# Patient Record
Sex: FEMALE | Race: WHITE | NOT HISPANIC OR LATINO | Employment: STUDENT | URBAN - METROPOLITAN AREA
[De-identification: names, ages, dates, MRNs, and addresses within clinical notes are randomized per-mention and may not be internally consistent; named-entity substitution may affect disease eponyms.]

---
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
## 2021-01-06 DIAGNOSIS — C83.30 DIFFUSE LARGE B-CELL LYMPHOMA, UNSPECIFIED BODY REGION: Primary | ICD-10-CM

## 2021-01-15 ENCOUNTER — HOSPITAL ENCOUNTER (OUTPATIENT)
Dept: PEDIATRIC CARDIOLOGY | Facility: HOSPITAL | Age: 21
Discharge: HOME | End: 2021-01-15
Attending: PEDIATRICS
Payer: COMMERCIAL

## 2021-01-15 ENCOUNTER — HOSPITAL ENCOUNTER (OUTPATIENT)
Dept: RADIOLOGY | Facility: HOSPITAL | Age: 21
Discharge: HOME | End: 2021-01-15
Attending: PEDIATRICS
Payer: COMMERCIAL

## 2021-01-15 ENCOUNTER — OFFICE VISIT (OUTPATIENT)
Dept: PEDIATRIC HEMATOLOGY/ONCOLOGY | Facility: CLINIC | Age: 21
End: 2021-01-15
Payer: COMMERCIAL

## 2021-01-15 VITALS
BODY MASS INDEX: 29.37 KG/M2 | RESPIRATION RATE: 18 BRPM | HEART RATE: 92 BPM | WEIGHT: 155.56 LBS | SYSTOLIC BLOOD PRESSURE: 129 MMHG | DIASTOLIC BLOOD PRESSURE: 73 MMHG | HEIGHT: 61 IN | TEMPERATURE: 98 F

## 2021-01-15 DIAGNOSIS — C83.30 DIFFUSE LARGE B-CELL LYMPHOMA, UNSPECIFIED BODY REGION: ICD-10-CM

## 2021-01-15 DIAGNOSIS — C83.30 DIFFUSE LARGE B-CELL LYMPHOMA, UNSPECIFIED BODY REGION: Primary | ICD-10-CM

## 2021-01-15 PROCEDURE — 3008F BODY MASS INDEX DOCD: CPT | Mod: CPTII,S$GLB,, | Performed by: PEDIATRICS

## 2021-01-15 PROCEDURE — 3008F PR BODY MASS INDEX (BMI) DOCUMENTED: ICD-10-PCS | Mod: CPTII,S$GLB,, | Performed by: PEDIATRICS

## 2021-01-15 PROCEDURE — 93304 PR ECHO XTHORACIC,CONG A2M,LIMITED: ICD-10-PCS | Mod: 26,,, | Performed by: PEDIATRICS

## 2021-01-15 PROCEDURE — 93304 ECHO TRANSTHORACIC: CPT | Mod: 26,,, | Performed by: PEDIATRICS

## 2021-01-15 PROCEDURE — 73723 MRI JOINT LWR EXTR W/O&W/DYE: CPT | Mod: TC,LT

## 2021-01-15 PROCEDURE — 99999 PR PBB SHADOW E&M-EST. PATIENT-LVL III: ICD-10-PCS | Mod: PBBFAC,,, | Performed by: PEDIATRICS

## 2021-01-15 PROCEDURE — 73723 MRI KNEE W WO CONTRAST LEFT: ICD-10-PCS | Mod: 26,LT,, | Performed by: RADIOLOGY

## 2021-01-15 PROCEDURE — A9585 GADOBUTROL INJECTION: HCPCS | Performed by: PEDIATRICS

## 2021-01-15 PROCEDURE — 93321 PR DOPPLER ECHO HEART,LIMITED,F/U: ICD-10-PCS | Mod: 26,,, | Performed by: PEDIATRICS

## 2021-01-15 PROCEDURE — 99214 OFFICE O/P EST MOD 30 MIN: CPT | Mod: S$GLB,,, | Performed by: PEDIATRICS

## 2021-01-15 PROCEDURE — 93321 DOPPLER ECHO F-UP/LMTD STD: CPT | Mod: 26,,, | Performed by: PEDIATRICS

## 2021-01-15 PROCEDURE — 25500020 PHARM REV CODE 255: Performed by: PEDIATRICS

## 2021-01-15 PROCEDURE — 1126F AMNT PAIN NOTED NONE PRSNT: CPT | Mod: S$GLB,,, | Performed by: PEDIATRICS

## 2021-01-15 PROCEDURE — 99214 PR OFFICE/OUTPT VISIT, EST, LEVL IV, 30-39 MIN: ICD-10-PCS | Mod: S$GLB,,, | Performed by: PEDIATRICS

## 2021-01-15 PROCEDURE — 99999 PR PBB SHADOW E&M-EST. PATIENT-LVL III: CPT | Mod: PBBFAC,,, | Performed by: PEDIATRICS

## 2021-01-15 PROCEDURE — 93325 PR DOPPLER COLOR FLOW VELOCITY MAP: ICD-10-PCS | Mod: 26,,, | Performed by: PEDIATRICS

## 2021-01-15 PROCEDURE — 73723 MRI JOINT LWR EXTR W/O&W/DYE: CPT | Mod: 26,LT,, | Performed by: RADIOLOGY

## 2021-01-15 PROCEDURE — 1126F PR PAIN SEVERITY QUANTIFIED, NO PAIN PRESENT: ICD-10-PCS | Mod: S$GLB,,, | Performed by: PEDIATRICS

## 2021-01-15 PROCEDURE — 93325 DOPPLER ECHO COLOR FLOW MAPG: CPT | Mod: 26,,, | Performed by: PEDIATRICS

## 2021-01-15 RX ORDER — GADOBUTROL 604.72 MG/ML
7 INJECTION INTRAVENOUS
Status: COMPLETED | OUTPATIENT
Start: 2021-01-15 | End: 2021-01-15

## 2021-01-15 RX ADMIN — GADOBUTROL 7 ML: 604.72 INJECTION INTRAVENOUS at 02:01

## 2021-12-10 ENCOUNTER — OFFICE VISIT (OUTPATIENT)
Dept: URGENT CARE | Facility: CLINIC | Age: 21
End: 2021-12-10
Payer: COMMERCIAL

## 2021-12-10 VITALS
WEIGHT: 155 LBS | TEMPERATURE: 98 F | HEART RATE: 109 BPM | RESPIRATION RATE: 16 BRPM | HEIGHT: 61 IN | DIASTOLIC BLOOD PRESSURE: 70 MMHG | BODY MASS INDEX: 29.27 KG/M2 | SYSTOLIC BLOOD PRESSURE: 119 MMHG | OXYGEN SATURATION: 97 %

## 2021-12-10 DIAGNOSIS — J02.9 VIRAL PHARYNGITIS: Primary | ICD-10-CM

## 2021-12-10 DIAGNOSIS — J06.9 UPPER RESPIRATORY INFECTION WITH COUGH AND CONGESTION: ICD-10-CM

## 2021-12-10 DIAGNOSIS — J10.1 INFLUENZA B: ICD-10-CM

## 2021-12-10 LAB
CTP QC/QA: YES
HETEROPH AB SER QL: NEGATIVE
MOLECULAR STREP A: NEGATIVE
POC MOLECULAR INFLUENZA A AGN: NEGATIVE
POC MOLECULAR INFLUENZA B AGN: POSITIVE

## 2021-12-10 PROCEDURE — 87651 STREP A DNA AMP PROBE: CPT | Mod: QW,S$GLB,, | Performed by: PHYSICIAN ASSISTANT

## 2021-12-10 PROCEDURE — 99213 OFFICE O/P EST LOW 20 MIN: CPT | Mod: S$GLB,,, | Performed by: PHYSICIAN ASSISTANT

## 2021-12-10 PROCEDURE — 87651 POCT STREP A MOLECULAR: ICD-10-PCS | Mod: QW,S$GLB,, | Performed by: PHYSICIAN ASSISTANT

## 2021-12-10 PROCEDURE — 86308 POCT INFECTIOUS MONONUCLEOSIS: ICD-10-PCS | Mod: QW,S$GLB,, | Performed by: PHYSICIAN ASSISTANT

## 2021-12-10 PROCEDURE — 86308 HETEROPHILE ANTIBODY SCREEN: CPT | Mod: QW,S$GLB,, | Performed by: PHYSICIAN ASSISTANT

## 2021-12-10 PROCEDURE — 87502 POCT INFLUENZA A/B MOLECULAR: ICD-10-PCS | Mod: QW,S$GLB,, | Performed by: PHYSICIAN ASSISTANT

## 2021-12-10 PROCEDURE — 99213 PR OFFICE/OUTPT VISIT, EST, LEVL III, 20-29 MIN: ICD-10-PCS | Mod: S$GLB,,, | Performed by: PHYSICIAN ASSISTANT

## 2021-12-10 PROCEDURE — 87502 INFLUENZA DNA AMP PROBE: CPT | Mod: QW,S$GLB,, | Performed by: PHYSICIAN ASSISTANT

## 2021-12-10 RX ORDER — FLUTICASONE PROPIONATE 50 MCG
2 SPRAY, SUSPENSION (ML) NASAL DAILY
Qty: 9.9 ML | Refills: 0 | Status: SHIPPED | OUTPATIENT
Start: 2021-12-10 | End: 2021-12-17

## 2021-12-10 RX ORDER — BROMPHENIRAMINE MALEATE, PSEUDOEPHEDRINE HYDROCHLORIDE, AND DEXTROMETHORPHAN HYDROBROMIDE 2; 30; 10 MG/5ML; MG/5ML; MG/5ML
10 SYRUP ORAL EVERY 4 HOURS PRN
Qty: 120 ML | Refills: 0 | Status: SHIPPED | OUTPATIENT
Start: 2021-12-10 | End: 2021-12-17

## 2021-12-13 ENCOUNTER — TELEPHONE (OUTPATIENT)
Dept: URGENT CARE | Facility: CLINIC | Age: 21
End: 2021-12-13
Payer: COMMERCIAL

## 2022-01-11 ENCOUNTER — HOSPITAL ENCOUNTER (OUTPATIENT)
Dept: RADIOLOGY | Facility: HOSPITAL | Age: 22
Discharge: HOME | End: 2022-01-11
Attending: PEDIATRICS
Payer: COMMERCIAL

## 2022-01-11 ENCOUNTER — HOSPITAL ENCOUNTER (OUTPATIENT)
Dept: PEDIATRIC CARDIOLOGY | Facility: HOSPITAL | Age: 22
Discharge: HOME | End: 2022-01-11
Attending: PEDIATRICS
Payer: COMMERCIAL

## 2022-01-11 ENCOUNTER — OFFICE VISIT (OUTPATIENT)
Dept: PEDIATRIC HEMATOLOGY/ONCOLOGY | Facility: CLINIC | Age: 22
End: 2022-01-11
Payer: COMMERCIAL

## 2022-01-11 VITALS
HEIGHT: 61 IN | WEIGHT: 160.06 LBS | HEART RATE: 82 BPM | BODY MASS INDEX: 30.22 KG/M2 | RESPIRATION RATE: 20 BRPM | SYSTOLIC BLOOD PRESSURE: 113 MMHG | TEMPERATURE: 98 F | DIASTOLIC BLOOD PRESSURE: 63 MMHG

## 2022-01-11 DIAGNOSIS — C83.30 DIFFUSE LARGE B-CELL LYMPHOMA, UNSPECIFIED BODY REGION: ICD-10-CM

## 2022-01-11 DIAGNOSIS — C83.30 DIFFUSE LARGE B-CELL LYMPHOMA, UNSPECIFIED BODY REGION: Primary | ICD-10-CM

## 2022-01-11 PROCEDURE — 99999 PR PBB SHADOW E&M-EST. PATIENT-LVL III: ICD-10-PCS | Mod: PBBFAC,,, | Performed by: PEDIATRICS

## 2022-01-11 PROCEDURE — 93325 DOPPLER ECHO COLOR FLOW MAPG: CPT

## 2022-01-11 PROCEDURE — 25500020 PHARM REV CODE 255: Performed by: PEDIATRICS

## 2022-01-11 PROCEDURE — 99214 OFFICE O/P EST MOD 30 MIN: CPT | Mod: S$GLB,,, | Performed by: PEDIATRICS

## 2022-01-11 PROCEDURE — A9585 GADOBUTROL INJECTION: HCPCS | Performed by: PEDIATRICS

## 2022-01-11 PROCEDURE — 99214 PR OFFICE/OUTPT VISIT, EST, LEVL IV, 30-39 MIN: ICD-10-PCS | Mod: S$GLB,,, | Performed by: PEDIATRICS

## 2022-01-11 PROCEDURE — 99999 PR PBB SHADOW E&M-EST. PATIENT-LVL III: CPT | Mod: PBBFAC,,, | Performed by: PEDIATRICS

## 2022-01-11 PROCEDURE — 93321 PEDIATRIC ECHO (CUPID ONLY): ICD-10-PCS | Mod: 26,,, | Performed by: PEDIATRICS

## 2022-01-11 PROCEDURE — 93304 ECHO TRANSTHORACIC: CPT | Mod: 26,,, | Performed by: PEDIATRICS

## 2022-01-11 PROCEDURE — 93325 DOPPLER ECHO COLOR FLOW MAPG: CPT | Mod: 26,,, | Performed by: PEDIATRICS

## 2022-01-11 PROCEDURE — 93304 ECHO TRANSTHORACIC: CPT

## 2022-01-11 PROCEDURE — 93321 DOPPLER ECHO F-UP/LMTD STD: CPT

## 2022-01-11 PROCEDURE — 93304 PEDIATRIC ECHO (CUPID ONLY): ICD-10-PCS | Mod: 26,,, | Performed by: PEDIATRICS

## 2022-01-11 PROCEDURE — 93325 PEDIATRIC ECHO (CUPID ONLY): ICD-10-PCS | Mod: 26,,, | Performed by: PEDIATRICS

## 2022-01-11 PROCEDURE — 73723 MRI JOINT LWR EXTR W/O&W/DYE: CPT | Mod: 26,LT,, | Performed by: RADIOLOGY

## 2022-01-11 PROCEDURE — 73723 MRI KNEE W WO CONTRAST LEFT: ICD-10-PCS | Mod: 26,LT,, | Performed by: RADIOLOGY

## 2022-01-11 PROCEDURE — 73723 MRI JOINT LWR EXTR W/O&W/DYE: CPT | Mod: TC,LT

## 2022-01-11 PROCEDURE — 93321 DOPPLER ECHO F-UP/LMTD STD: CPT | Mod: 26,,, | Performed by: PEDIATRICS

## 2022-01-11 RX ORDER — GADOBUTROL 604.72 MG/ML
7 INJECTION INTRAVENOUS
Status: COMPLETED | OUTPATIENT
Start: 2022-01-11 | End: 2022-01-11

## 2022-01-11 RX ADMIN — GADOBUTROL 7 ML: 604.72 INJECTION INTRAVENOUS at 03:01

## 2022-01-13 NOTE — PROGRESS NOTES
Subjective:       Patient ID: Angeles Guerin is a 21 y.o. female.    Chief Complaint: No chief complaint on file.  Angeles is a 20 year old female referred for a second opinion about Diffuse Large B Cell Lymphoma    Initial visit: She was in her usual health until approximately 8 months ago when she started complaining of left knee pain.  Initially thought to be msk in origin and was treated with supportive care.  About two months ago the pain started to worsen which inevitably led to an xray and then MRI of her left femur which showed a mass in her distal femur.  [place] [facility] [place].  Biopsy of the lesion was c/w DLBCL.  She had a CT of her neck, chest, abdomen , and pelvis which did not show any evidence of lymphoma.  Referred here by her oncologist at [facility] for a second opinion         S/p 6 cycles of R-CHOP.  Pt here by herself.  She has done very well since last visit,    No new issues.   Doing well in college  Denies any fevers, weight loss, night sweats.   Menstraul periods normalized on ocps    Follow-up  Pertinent negatives include no chest pain, congestion, coughing, fatigue, fever, headaches, nausea, neck pain, rash, vomiting or weakness.     Review of Systems   Constitutional: Negative for activity change, appetite change, fatigue and fever.   HENT: Negative for congestion, hearing loss, mouth sores, nosebleeds, rhinorrhea and sneezing.    Eyes: Negative for photophobia and visual disturbance.   Respiratory: Negative for cough, chest tightness, shortness of breath and wheezing.    Cardiovascular: Negative for chest pain, palpitations and leg swelling.   Gastrointestinal: Negative for abdominal distention, blood in stool, constipation, diarrhea, nausea and vomiting.   Genitourinary: Negative for difficulty urinating, hematuria, menstrual problem and pelvic pain.   Musculoskeletal: Negative for gait problem and neck pain.   Skin: Negative for pallor and rash.   Neurological:  Negative for dizziness, weakness, light-headedness and headaches.   Hematological: Negative for adenopathy. Does not bruise/bleed easily.   Psychiatric/Behavioral: Negative for behavioral problems.       Objective:      Physical Exam  Constitutional:       Appearance: She is well-developed.   HENT:      Head: Normocephalic and atraumatic.      Right Ear: External ear normal.      Left Ear: External ear normal.      Nose: Nose normal.   Eyes:      Pupils: Pupils are equal, round, and reactive to light.   Cardiovascular:      Rate and Rhythm: Normal rate and regular rhythm.      Heart sounds: Normal heart sounds. No murmur heard.  No friction rub. No gallop.    Pulmonary:      Effort: No respiratory distress.      Breath sounds: Normal breath sounds. No wheezing or rales.   Chest:      Chest wall: No tenderness.   Abdominal:      General: Bowel sounds are normal. There is no distension.      Palpations: Abdomen is soft. There is no mass.      Tenderness: There is no abdominal tenderness. There is no guarding or rebound.   Musculoskeletal:         General: No tenderness. Normal range of motion.      Cervical back: Normal range of motion and neck supple.   Lymphadenopathy:      Cervical: No cervical adenopathy.   Skin:     General: Skin is warm and dry.      Coloration: Skin is not pale.      Findings: No erythema or rash.   Neurological:      Mental Status: She is alert and oriented to person, place, and time.      Cranial Nerves: No cranial nerve deficit.         Assessment:       No diagnosis found.    Plan:       19yo female with DLBCL of the bone  PErsonally reviewed CT neck c/a/p. Bone scan, PET scan.  No evidence of disease outside the femur lesion  CSF negative  Bone marrow negative  Our pathologist reviewed path and agrees that this is DLBCL although cannot subtype it nor can they do genetic studies given decalcified specimend  Finished therapy 9/2017  Total Cytoxan dose: 4500 mg/m2  Total Anthracycline dose:   300 mg/m2       Restaging MRI  Negative   Counts good   No symptoms  ESR nl   ECHO excellent         RTC in 1 yr   MRI and labs                 I spent 30min with family >50% in counseling

## 2022-04-22 ENCOUNTER — TELEPHONE (OUTPATIENT)
Dept: PEDIATRIC HEMATOLOGY/ONCOLOGY | Facility: CLINIC | Age: 22
End: 2022-04-22
Payer: COMMERCIAL

## 2022-04-22 NOTE — TELEPHONE ENCOUNTER
Pt called stating she started with pain to her left knee similar to the pain she experienced when she was diagnosed with lymphoma in 2017, is asking about getting MRI to assess. Pt reports she has been playing volleyball and did recently have inflammation noted under her left knee cap, but states she was playing volleyball when diagnosed in 2017, reports no recent traumas or injuries. Pt was seen and scanned in 1/2022. Informed Dr Brown of above, he states pt to be assessed by ortho, as it would be unlikely to be relapse and more likely to be an orthopedic issue, once assessed then ortho can decide correct scan pt needs. Informed pt of above, instructed her to keep this office updated on status, she repeated back and verbalized complete understanding.

## 2023-01-12 ENCOUNTER — HOSPITAL ENCOUNTER (OUTPATIENT)
Dept: RADIOLOGY | Facility: HOSPITAL | Age: 23
Discharge: HOME | End: 2023-01-12
Attending: PEDIATRICS
Payer: COMMERCIAL

## 2023-01-12 DIAGNOSIS — C83.30 DIFFUSE LARGE B-CELL LYMPHOMA, UNSPECIFIED BODY REGION: ICD-10-CM

## 2023-01-12 PROCEDURE — 73723 MRI JOINT LWR EXTR W/O&W/DYE: CPT | Mod: TC,LT

## 2023-01-12 PROCEDURE — 25500020 PHARM REV CODE 255: Performed by: PEDIATRICS

## 2023-01-12 PROCEDURE — 73723 MRI JOINT LWR EXTR W/O&W/DYE: CPT | Mod: 26,LT,GC, | Performed by: RADIOLOGY

## 2023-01-12 PROCEDURE — A9585 GADOBUTROL INJECTION: HCPCS | Performed by: PEDIATRICS

## 2023-01-12 PROCEDURE — 73723 MRI KNEE W WO CONTRAST LEFT: ICD-10-PCS | Mod: 26,LT,GC, | Performed by: RADIOLOGY

## 2023-01-12 RX ORDER — GADOBUTROL 604.72 MG/ML
7 INJECTION INTRAVENOUS
Status: COMPLETED | OUTPATIENT
Start: 2023-01-12 | End: 2023-01-12

## 2023-01-12 RX ADMIN — GADOBUTROL 7 ML: 604.72 INJECTION INTRAVENOUS at 07:01

## 2023-01-13 ENCOUNTER — LAB VISIT (OUTPATIENT)
Dept: LAB | Facility: HOSPITAL | Age: 23
End: 2023-01-13
Attending: PEDIATRICS
Payer: COMMERCIAL

## 2023-01-13 ENCOUNTER — OFFICE VISIT (OUTPATIENT)
Dept: PEDIATRIC HEMATOLOGY/ONCOLOGY | Facility: CLINIC | Age: 23
End: 2023-01-13
Payer: COMMERCIAL

## 2023-01-13 VITALS
HEART RATE: 73 BPM | DIASTOLIC BLOOD PRESSURE: 67 MMHG | TEMPERATURE: 98 F | HEIGHT: 62 IN | OXYGEN SATURATION: 99 % | SYSTOLIC BLOOD PRESSURE: 122 MMHG | BODY MASS INDEX: 29.35 KG/M2 | WEIGHT: 159.5 LBS | RESPIRATION RATE: 20 BRPM

## 2023-01-13 DIAGNOSIS — C83.30 DIFFUSE LARGE B-CELL LYMPHOMA, UNSPECIFIED BODY REGION: Primary | ICD-10-CM

## 2023-01-13 DIAGNOSIS — C83.30 DIFFUSE LARGE B-CELL LYMPHOMA, UNSPECIFIED BODY REGION: ICD-10-CM

## 2023-01-13 LAB
ALBUMIN SERPL BCP-MCNC: 4.1 G/DL (ref 3.5–5.2)
ALP SERPL-CCNC: 51 U/L (ref 55–135)
ALT SERPL W/O P-5'-P-CCNC: 15 U/L (ref 10–44)
ANION GAP SERPL CALC-SCNC: 5 MMOL/L (ref 8–16)
AST SERPL-CCNC: 17 U/L (ref 10–40)
BASOPHILS # BLD AUTO: 0.04 K/UL (ref 0–0.2)
BASOPHILS NFR BLD: 0.5 % (ref 0–1.9)
BILIRUB SERPL-MCNC: 0.6 MG/DL (ref 0.1–1)
BUN SERPL-MCNC: 11 MG/DL (ref 6–20)
CALCIUM SERPL-MCNC: 9.2 MG/DL (ref 8.7–10.5)
CHLORIDE SERPL-SCNC: 107 MMOL/L (ref 95–110)
CO2 SERPL-SCNC: 25 MMOL/L (ref 23–29)
CREAT SERPL-MCNC: 0.8 MG/DL (ref 0.5–1.4)
DIFFERENTIAL METHOD: ABNORMAL
EOSINOPHIL # BLD AUTO: 0.1 K/UL (ref 0–0.5)
EOSINOPHIL NFR BLD: 1.6 % (ref 0–8)
ERYTHROCYTE [DISTWIDTH] IN BLOOD BY AUTOMATED COUNT: 11.9 % (ref 11.5–14.5)
ERYTHROCYTE [SEDIMENTATION RATE] IN BLOOD BY PHOTOMETRIC METHOD: 10 MM/HR (ref 0–36)
EST. GFR  (NO RACE VARIABLE): >60 ML/MIN/1.73 M^2
GLUCOSE SERPL-MCNC: 83 MG/DL (ref 70–110)
HCT VFR BLD AUTO: 38.4 % (ref 37–48.5)
HGB BLD-MCNC: 12.6 G/DL (ref 12–16)
IMM GRANULOCYTES # BLD AUTO: 0.02 K/UL (ref 0–0.04)
IMM GRANULOCYTES NFR BLD AUTO: 0.3 % (ref 0–0.5)
LYMPHOCYTES # BLD AUTO: 4.1 K/UL (ref 1–4.8)
LYMPHOCYTES NFR BLD: 55.7 % (ref 18–48)
MCH RBC QN AUTO: 30.2 PG (ref 27–31)
MCHC RBC AUTO-ENTMCNC: 32.8 G/DL (ref 32–36)
MCV RBC AUTO: 92 FL (ref 82–98)
MONOCYTES # BLD AUTO: 0.6 K/UL (ref 0.3–1)
MONOCYTES NFR BLD: 8.3 % (ref 4–15)
NEUTROPHILS # BLD AUTO: 2.5 K/UL (ref 1.8–7.7)
NEUTROPHILS NFR BLD: 33.6 % (ref 38–73)
NRBC BLD-RTO: 0 /100 WBC
PLATELET # BLD AUTO: 283 K/UL (ref 150–450)
PMV BLD AUTO: 10.1 FL (ref 9.2–12.9)
POTASSIUM SERPL-SCNC: 4.4 MMOL/L (ref 3.5–5.1)
PROT SERPL-MCNC: 7.3 G/DL (ref 6–8.4)
RBC # BLD AUTO: 4.17 M/UL (ref 4–5.4)
RETICS/RBC NFR AUTO: 1.4 % (ref 0.5–2.5)
SODIUM SERPL-SCNC: 137 MMOL/L (ref 136–145)
WBC # BLD AUTO: 7.43 K/UL (ref 3.9–12.7)

## 2023-01-13 PROCEDURE — 3074F SYST BP LT 130 MM HG: CPT | Mod: CPTII,S$GLB,, | Performed by: PEDIATRICS

## 2023-01-13 PROCEDURE — 1160F PR REVIEW ALL MEDS BY PRESCRIBER/CLIN PHARMACIST DOCUMENTED: ICD-10-PCS | Mod: CPTII,S$GLB,, | Performed by: PEDIATRICS

## 2023-01-13 PROCEDURE — 3074F PR MOST RECENT SYSTOLIC BLOOD PRESSURE < 130 MM HG: ICD-10-PCS | Mod: CPTII,S$GLB,, | Performed by: PEDIATRICS

## 2023-01-13 PROCEDURE — 99214 PR OFFICE/OUTPT VISIT, EST, LEVL IV, 30-39 MIN: ICD-10-PCS | Mod: S$GLB,,, | Performed by: PEDIATRICS

## 2023-01-13 PROCEDURE — 80053 COMPREHEN METABOLIC PANEL: CPT | Performed by: PEDIATRICS

## 2023-01-13 PROCEDURE — 99999 PR PBB SHADOW E&M-EST. PATIENT-LVL III: ICD-10-PCS | Mod: PBBFAC,,, | Performed by: PEDIATRICS

## 2023-01-13 PROCEDURE — 36415 COLL VENOUS BLD VENIPUNCTURE: CPT | Performed by: PEDIATRICS

## 2023-01-13 PROCEDURE — 85045 AUTOMATED RETICULOCYTE COUNT: CPT | Performed by: PEDIATRICS

## 2023-01-13 PROCEDURE — 99214 OFFICE O/P EST MOD 30 MIN: CPT | Mod: S$GLB,,, | Performed by: PEDIATRICS

## 2023-01-13 PROCEDURE — 85025 COMPLETE CBC W/AUTO DIFF WBC: CPT | Performed by: PEDIATRICS

## 2023-01-13 PROCEDURE — 1159F MED LIST DOCD IN RCRD: CPT | Mod: CPTII,S$GLB,, | Performed by: PEDIATRICS

## 2023-01-13 PROCEDURE — 3078F DIAST BP <80 MM HG: CPT | Mod: CPTII,S$GLB,, | Performed by: PEDIATRICS

## 2023-01-13 PROCEDURE — 1160F RVW MEDS BY RX/DR IN RCRD: CPT | Mod: CPTII,S$GLB,, | Performed by: PEDIATRICS

## 2023-01-13 PROCEDURE — 3008F BODY MASS INDEX DOCD: CPT | Mod: CPTII,S$GLB,, | Performed by: PEDIATRICS

## 2023-01-13 PROCEDURE — 1159F PR MEDICATION LIST DOCUMENTED IN MEDICAL RECORD: ICD-10-PCS | Mod: CPTII,S$GLB,, | Performed by: PEDIATRICS

## 2023-01-13 PROCEDURE — 3078F PR MOST RECENT DIASTOLIC BLOOD PRESSURE < 80 MM HG: ICD-10-PCS | Mod: CPTII,S$GLB,, | Performed by: PEDIATRICS

## 2023-01-13 PROCEDURE — 85652 RBC SED RATE AUTOMATED: CPT | Performed by: PEDIATRICS

## 2023-01-13 PROCEDURE — 99999 PR PBB SHADOW E&M-EST. PATIENT-LVL III: CPT | Mod: PBBFAC,,, | Performed by: PEDIATRICS

## 2023-01-13 PROCEDURE — 3008F PR BODY MASS INDEX (BMI) DOCUMENTED: ICD-10-PCS | Mod: CPTII,S$GLB,, | Performed by: PEDIATRICS

## 2023-05-30 NOTE — PROGRESS NOTES
1555- Patient monitored 15 minutes post vaccination with no signs and symptoms of adverse effects. Patient discharged to home with family.    Patient canceled appointment today with PCP due to a sore back. (which is worsen over the weekend). It appears he has had this issue for sometime. He said he does his PT exercises at home, X- Ray from 4/6/23 is fairly unremarkable. He was reminded to move, stretch, Tylenol, heat / cold.   In the end, when asked what exactly he was looking for, it was \"something stronger then Tylenol.\"  Patient was reminded he would need to see PCP for his appointment to discuss a possible narcotic prescription. He said he cannot come in, due to pain and walking issues. He will see if he can come in towards the end of the week. He was told the clinic would accomodate him for an appointment then.

## 2023-12-05 DIAGNOSIS — C83.30 DIFFUSE LARGE B-CELL LYMPHOMA, UNSPECIFIED BODY REGION: Primary | ICD-10-CM

## 2024-01-11 ENCOUNTER — OFFICE VISIT (OUTPATIENT)
Dept: PEDIATRIC HEMATOLOGY/ONCOLOGY | Facility: CLINIC | Age: 24
End: 2024-01-11
Payer: COMMERCIAL

## 2024-01-11 ENCOUNTER — HOSPITAL ENCOUNTER (OUTPATIENT)
Dept: RADIOLOGY | Facility: HOSPITAL | Age: 24
Discharge: HOME | End: 2024-01-11
Attending: PEDIATRICS
Payer: COMMERCIAL

## 2024-01-11 VITALS
HEART RATE: 78 BPM | BODY MASS INDEX: 29.92 KG/M2 | HEIGHT: 62 IN | TEMPERATURE: 98 F | WEIGHT: 162.56 LBS | RESPIRATION RATE: 20 BRPM | SYSTOLIC BLOOD PRESSURE: 110 MMHG | DIASTOLIC BLOOD PRESSURE: 62 MMHG

## 2024-01-11 DIAGNOSIS — C83.30 DIFFUSE LARGE B-CELL LYMPHOMA, UNSPECIFIED BODY REGION: ICD-10-CM

## 2024-01-11 DIAGNOSIS — C83.30 DIFFUSE LARGE B-CELL LYMPHOMA, UNSPECIFIED BODY REGION: Primary | ICD-10-CM

## 2024-01-11 PROCEDURE — 3078F DIAST BP <80 MM HG: CPT | Mod: CPTII,S$GLB,, | Performed by: PEDIATRICS

## 2024-01-11 PROCEDURE — A9585 GADOBUTROL INJECTION: HCPCS | Performed by: PEDIATRICS

## 2024-01-11 PROCEDURE — 25500020 PHARM REV CODE 255: Performed by: PEDIATRICS

## 2024-01-11 PROCEDURE — 1159F MED LIST DOCD IN RCRD: CPT | Mod: CPTII,S$GLB,, | Performed by: PEDIATRICS

## 2024-01-11 PROCEDURE — 1160F RVW MEDS BY RX/DR IN RCRD: CPT | Mod: CPTII,S$GLB,, | Performed by: PEDIATRICS

## 2024-01-11 PROCEDURE — 73723 MRI JOINT LWR EXTR W/O&W/DYE: CPT | Mod: 26,LT,, | Performed by: INTERNAL MEDICINE

## 2024-01-11 PROCEDURE — 99214 OFFICE O/P EST MOD 30 MIN: CPT | Mod: S$GLB,,, | Performed by: PEDIATRICS

## 2024-01-11 PROCEDURE — 3074F SYST BP LT 130 MM HG: CPT | Mod: CPTII,S$GLB,, | Performed by: PEDIATRICS

## 2024-01-11 PROCEDURE — 73723 MRI JOINT LWR EXTR W/O&W/DYE: CPT | Mod: TC,LT

## 2024-01-11 PROCEDURE — 99999 PR PBB SHADOW E&M-EST. PATIENT-LVL III: CPT | Mod: PBBFAC,,, | Performed by: PEDIATRICS

## 2024-01-11 PROCEDURE — 3008F BODY MASS INDEX DOCD: CPT | Mod: CPTII,S$GLB,, | Performed by: PEDIATRICS

## 2024-01-11 RX ORDER — GADOBUTROL 604.72 MG/ML
8 INJECTION INTRAVENOUS
Status: COMPLETED | OUTPATIENT
Start: 2024-01-11 | End: 2024-01-11

## 2024-01-11 RX ADMIN — GADOBUTROL 8 ML: 604.72 INJECTION INTRAVENOUS at 08:01

## 2024-01-11 NOTE — PROGRESS NOTES
Subjective:       Patient ID: Angeles Guerin is a 23 y.o. female.    Chief Complaint: No chief complaint on file.  Angeles is a  23 year old female referred for a second opinion about Diffuse Large B Cell Lymphoma    Initial visit: She was in her usual health until approximately 8 months ago when she started complaining of left knee pain.  Initially thought to be msk in origin and was treated with supportive care.  About two months ago the pain started to worsen which inevitably led to an xray and then MRI of her left femur which showed a mass in her distal femur.  [place] [facility] [place].  Biopsy of the lesion was c/w DLBCL.  She had a CT of her neck, chest, abdomen , and pelvis which did not show any evidence of lymphoma.  Referred here by her oncologist at [facility] for a second opinion         S/p 6 cycles of R-CHOP.   .   She has done very well since last visit,    No new issues.   Doing well in college - graduating this year Denies any fevers, weight loss, night sweats.   Menstraul periods normalized on ocps    Follow-up  Pertinent negatives include no chest pain, congestion, coughing, fatigue, fever, headaches, nausea, neck pain, rash, vomiting or weakness.     Review of Systems   Constitutional:  Negative for activity change, appetite change, fatigue and fever.   HENT:  Negative for congestion, hearing loss, mouth sores, nosebleeds, rhinorrhea and sneezing.    Eyes:  Negative for photophobia and visual disturbance.   Respiratory:  Negative for cough, chest tightness, shortness of breath and wheezing.    Cardiovascular:  Negative for chest pain, palpitations and leg swelling.   Gastrointestinal:  Negative for abdominal distention, blood in stool, constipation, diarrhea, nausea and vomiting.   Genitourinary:  Negative for difficulty urinating, hematuria, menstrual problem and pelvic pain.   Musculoskeletal:  Negative for gait problem and neck pain.   Skin:  Negative for pallor and rash.    Neurological:  Negative for dizziness, weakness, light-headedness and headaches.   Hematological:  Negative for adenopathy. Does not bruise/bleed easily.   Psychiatric/Behavioral:  Negative for behavioral problems.        Objective:      Physical Exam  Constitutional:       Appearance: She is well-developed.   HENT:      Head: Normocephalic and atraumatic.      Right Ear: External ear normal.      Left Ear: External ear normal.      Nose: Nose normal.   Eyes:      Pupils: Pupils are equal, round, and reactive to light.   Cardiovascular:      Rate and Rhythm: Normal rate and regular rhythm.      Heart sounds: Normal heart sounds. No murmur heard.     No friction rub. No gallop.   Pulmonary:      Effort: No respiratory distress.      Breath sounds: Normal breath sounds. No wheezing or rales.   Chest:      Chest wall: No tenderness.   Abdominal:      General: Bowel sounds are normal. There is no distension.      Palpations: Abdomen is soft. There is no mass.      Tenderness: There is no abdominal tenderness. There is no guarding or rebound.   Musculoskeletal:         General: No tenderness. Normal range of motion.      Cervical back: Normal range of motion and neck supple.   Lymphadenopathy:      Cervical: No cervical adenopathy.   Skin:     General: Skin is warm and dry.      Coloration: Skin is not pale.      Findings: No erythema or rash.   Neurological:      Mental Status: She is alert and oriented to person, place, and time.      Cranial Nerves: No cranial nerve deficit.         Assessment:       No diagnosis found.    Plan:       21yo female with DLBCL of the bone  PErsonally reviewed CT neck c/a/p. Bone scan, PET scan.  No evidence of disease outside the femur lesion  CSF negative  Bone marrow negative  Our pathologist reviewed path and agrees that this is DLBCL although cannot subtype it nor can they do genetic studies given decalcified specimend  Finished therapy 9/2017  Total Cytoxan dose: 4500 mg/m2  Total  Anthracycline dose:  300 mg/m2       Restaging MRI  Negative   Counts good   No symptoms  ESR pending            RTC in 1 yr  PE ECHO and labs                 I spent 30min with family >50% in counseling

## 2024-03-10 ENCOUNTER — OFFICE VISIT (OUTPATIENT)
Dept: URGENT CARE | Facility: CLINIC | Age: 24
End: 2024-03-10
Payer: COMMERCIAL

## 2024-03-10 VITALS
HEART RATE: 81 BPM | SYSTOLIC BLOOD PRESSURE: 107 MMHG | DIASTOLIC BLOOD PRESSURE: 58 MMHG | OXYGEN SATURATION: 98 % | TEMPERATURE: 98 F | HEIGHT: 61 IN | BODY MASS INDEX: 31.13 KG/M2 | WEIGHT: 164.88 LBS | RESPIRATION RATE: 18 BRPM

## 2024-03-10 DIAGNOSIS — N30.01 ACUTE CYSTITIS WITH HEMATURIA: Primary | ICD-10-CM

## 2024-03-10 DIAGNOSIS — R30.0 DYSURIA: ICD-10-CM

## 2024-03-10 DIAGNOSIS — R31.29 OTHER MICROSCOPIC HEMATURIA: ICD-10-CM

## 2024-03-10 DIAGNOSIS — R39.15 URINARY URGENCY: ICD-10-CM

## 2024-03-10 DIAGNOSIS — R35.0 URINARY FREQUENCY: ICD-10-CM

## 2024-03-10 LAB
BILIRUB UR QL STRIP: NEGATIVE
GLUCOSE UR QL STRIP: NEGATIVE
KETONES UR QL STRIP: NEGATIVE
LEUKOCYTE ESTERASE UR QL STRIP: NEGATIVE
PH, POC UA: 6.5
POC BLOOD, URINE: POSITIVE
POC NITRATES, URINE: NEGATIVE
PROT UR QL STRIP: POSITIVE
SP GR UR STRIP: 1.02 (ref 1–1.03)
UROBILINOGEN UR STRIP-ACNC: ABNORMAL (ref 0.1–1.1)

## 2024-03-10 PROCEDURE — 99214 OFFICE O/P EST MOD 30 MIN: CPT | Mod: S$GLB,,, | Performed by: NURSE PRACTITIONER

## 2024-03-10 PROCEDURE — 81003 URINALYSIS AUTO W/O SCOPE: CPT | Mod: QW,S$GLB,, | Performed by: NURSE PRACTITIONER

## 2024-03-10 RX ORDER — SULFAMETHOXAZOLE AND TRIMETHOPRIM 800; 160 MG/1; MG/1
1 TABLET ORAL 2 TIMES DAILY
Qty: 10 TABLET | Refills: 0 | Status: SHIPPED | OUTPATIENT
Start: 2024-03-10 | End: 2024-03-15

## 2024-03-10 NOTE — PROGRESS NOTES
"Subjective:      Patient ID: Angeles Guerin is a 23 y.o. female.    Vitals:  height is 5' 1" (1.549 m) and weight is 74.8 kg (164 lb 14.5 oz). Her tympanic temperature is 97.9 °F (36.6 °C). Her blood pressure is 107/58 (abnormal) and her pulse is 81. Her respiration is 18 and oxygen saturation is 98%.     Chief Complaint: Dysuria    23 year old female presents for evaluation of dysuria, urinary urgency, and frequency x 3 days. Reports waking up during the night (Friday morning) with symptoms. Symptoms improved on Saturday and returned today. OTC Cranberry supplements, urinary tract health, and urinary pain relief tablets without relief. LMP Amenorrhea secondary to birth control    Dysuria   The current episode started in the past 7 days. The problem occurs every urination. The problem has been gradually worsening. The quality of the pain is described as aching. The pain is at a severity of 5/10. The pain is mild. She is Sexually active. There is No history of pyelonephritis. Associated symptoms include a discharge, frequency, urgency and constipation (last BM yesterday with straining. OTC Miralax with some relief.). Pertinent negatives include no flank pain, hematuria, hesitancy, nausea, possible pregnancy or vomiting. She has tried home medications for the symptoms.       Constitution: Negative.   HENT: Negative.     Neck: neck negative.   Cardiovascular: Negative.    Eyes: Negative.    Respiratory: Negative.     Gastrointestinal:  Positive for constipation (last BM yesterday with straining. OTC Miralax with some relief.). Negative for nausea and vomiting.   Endocrine: negative.   Genitourinary:  Positive for dysuria, frequency and urgency. Negative for flank pain and hematuria.   Musculoskeletal:  Positive for back pain (lower back).   Skin: Negative.    Allergic/Immunologic: Negative.    Neurological: Negative.    Hematologic/Lymphatic: Negative.    Psychiatric/Behavioral: Negative.        Objective:     Physical " Exam   Constitutional: She is oriented to person, place, and time. She is cooperative.  Non-toxic appearance. She appears ill (slightly). No distress. normalawake  HENT:   Head: Normocephalic and atraumatic.   Ears:   Right Ear: Hearing normal.   Left Ear: Hearing normal.   Eyes: Conjunctivae are normal. Pupils are equal, round, and reactive to light. Right eye exhibits no discharge. Left eye exhibits no discharge. No scleral icterus. Extraocular movement intact   Neck: Neck supple.   Cardiovascular: Normal rate, regular rhythm and normal heart sounds.   Pulmonary/Chest: Effort normal and breath sounds normal. No stridor. No respiratory distress. She has no wheezes. She has no rhonchi. She has no rales. She exhibits no tenderness.   Abdominal: Normal appearance and bowel sounds are normal. She exhibits no distension. Soft. flat abdomen There is no abdominal tenderness. There is no guarding, no tenderness at McBurney's point, negative Sheets's sign, no left CVA tenderness and no right CVA tenderness.   Musculoskeletal: Normal range of motion.         General: Normal range of motion.   Neurological: no focal deficit. She is alert and oriented to person, place, and time.   Skin: Skin is warm, dry and not diaphoretic.   Psychiatric: Her behavior is normal. Mood, judgment and thought content normal.   Nursing note and vitals reviewed.    Results for orders placed or performed in visit on 03/10/24   POCT Urinalysis, Dipstick, Automated, W/O Scope   Result Value Ref Range    POC Blood, Urine Positive (A) Negative    POC Bilirubin, Urine Negative Negative    POC Urobilinogen, Urine Norm 0.1 - 1.1    POC Ketones, Urine Negative Negative    POC Protein, Urine Positive (A) Negative    POC Nitrates, Urine Negative Negative    POC Glucose, Urine Negative Negative    pH, UA 6.5     POC Specific Gravity, Urine 1.020 1.003 - 1.029    POC Leukocytes, Urine Negative Negative       Assessment:     1. Acute cystitis with hematuria     2. Dysuria    3. Urinary frequency    4. Urinary urgency    5. Other microscopic hematuria        Plan:       Acute cystitis with hematuria  -     sulfamethoxazole-trimethoprim 800-160mg (BACTRIM DS) 800-160 mg Tab; Take 1 tablet by mouth 2 (two) times daily. for 5 days  Dispense: 10 tablet; Refill: 0    Dysuria  -     POCT Urinalysis, Dipstick, Automated, W/O Scope    Urinary frequency    Urinary urgency    Other microscopic hematuria        Patient presents with symptoms and examination that are consistent with acute uncomplicated UTI. UA (+)RBCs/protein. Exam negative for pyelonephritis/nephrolithiasis. Plan is to treat bacterial infection, manage symptoms, prevent worsening, and follow up as needed/with worsening. Discussed with patient who verbalizes understanding.         Patient Instructions   Rest  Hydration/increase fluids  Complete Bactrim course as directed  Continue OTC medications as directed for symptom relief  Typical course and duration of illness discussed  Signs and symptoms of worsening discussed  Follow up as needed/with worsening

## 2024-03-10 NOTE — PATIENT INSTRUCTIONS
Rest  Hydration/increase fluids  Complete Bactrim course as directed  Continue OTC medications as directed for symptom relief  Typical course and duration of illness discussed  Signs and symptoms of worsening discussed  Follow up as needed/with worsening

## 2024-03-18 ENCOUNTER — OFFICE VISIT (OUTPATIENT)
Dept: URGENT CARE | Facility: CLINIC | Age: 24
End: 2024-03-18
Payer: COMMERCIAL

## 2024-03-18 VITALS
BODY MASS INDEX: 29.4 KG/M2 | HEIGHT: 62 IN | HEART RATE: 74 BPM | WEIGHT: 159.75 LBS | DIASTOLIC BLOOD PRESSURE: 82 MMHG | RESPIRATION RATE: 16 BRPM | TEMPERATURE: 99 F | SYSTOLIC BLOOD PRESSURE: 120 MMHG | OXYGEN SATURATION: 99 %

## 2024-03-18 DIAGNOSIS — R30.0 DYSURIA: Primary | ICD-10-CM

## 2024-03-18 DIAGNOSIS — Z87.440 HISTORY OF UTI: ICD-10-CM

## 2024-03-18 LAB
B-HCG UR QL: NEGATIVE
BILIRUB UR QL STRIP: NEGATIVE
CTP QC/QA: YES
GLUCOSE UR QL STRIP: NEGATIVE
KETONES UR QL STRIP: NEGATIVE
LEUKOCYTE ESTERASE UR QL STRIP: NEGATIVE
PH, POC UA: 7
POC BLOOD, URINE: POSITIVE
POC NITRATES, URINE: NEGATIVE
PROT UR QL STRIP: NEGATIVE
SP GR UR STRIP: 1.01 (ref 1–1.03)
UROBILINOGEN UR STRIP-ACNC: ABNORMAL (ref 0.1–1.1)

## 2024-03-18 PROCEDURE — 81025 URINE PREGNANCY TEST: CPT | Mod: S$GLB,,,

## 2024-03-18 PROCEDURE — 81003 URINALYSIS AUTO W/O SCOPE: CPT | Mod: QW,S$GLB,,

## 2024-03-18 PROCEDURE — 99213 OFFICE O/P EST LOW 20 MIN: CPT | Mod: S$GLB,,,

## 2024-03-18 PROCEDURE — 87086 URINE CULTURE/COLONY COUNT: CPT

## 2024-03-18 NOTE — PROGRESS NOTES
Subjective:      Patient ID: Angeles Guerin is a 23 y.o. female.    Vitals:  vitals were not taken for this visit.     Chief Complaint: Dysuria    Patient seen on 3/10 and was diagnosed with UTI.  Treated and completed antibiotics.  Symptoms returned yesterday      Dysuria   This is a new problem. The current episode started yesterday. The problem occurs intermittently. The quality of the pain is described as burning. The patient is experiencing no pain. There has been no fever. She is Sexually active. There is No history of pyelonephritis. Associated symptoms include flank pain (minimal flank pain), frequency, hematuria, hesitancy (yesterday), urgency and withholding (yesterday). Pertinent negatives include no chills, discharge, nausea, possible pregnancy, vomiting, bubble bath use or constipation. She has tried nothing (cranberry juice and pills) for the symptoms. There is no history of diabetes insipidus, diabetes mellitus, hypertension, kidney stones, recurrent UTIs, a single kidney or STD.     Constitution: Negative for chills.   Gastrointestinal:  Negative for nausea, vomiting and constipation.   Genitourinary:  Positive for dysuria, frequency, urgency, flank pain (minimal flank pain) and hematuria.    Objective:     Physical Exam    Assessment:     1. Dysuria        Plan:       Dysuria  -     POCT Urinalysis, Dipstick, Automated, W/O Scope  -     POCT urine pregnancy

## 2024-03-18 NOTE — PATIENT INSTRUCTIONS
PLEASE READ YOUR DISCHARGE INSTRUCTIONS ENTIRELY AS IT CONTAINS IMPORTANT INFORMATION.      Take the antibiotics to completion if any were prescribed.     Drink plenty of fluids, wipe front to back, take showers not baths, no scented soaps, wear breathable cotton underwear, urinate after sexual intercourse.     IF A URINE CULTURE WAS SENT: You will be contacted once it results and appropriate action will be taken if needed.     If you were given a prescription for Pyridium (AZO): Take the pyridium three times a day with meals. It will turn your urine orange. If you wear contacts it can turn your contacts orange. You do not need to take the whole prescription you can stop this once the pain is better and finish out the antibiotics.    If you are are female and on BCP use additional methods to prevent pregnancy while on the antibiotics and for one cycle after.   Cranberry juice may help. Get the 100% cranberry juice and mix 4 oz of juice with 4 oz of water and drink this 8 oz glass of liquid once a day.     Please go to the ER for worsening symptoms including fever, worsening flank pain, vomiting, etc.     Please return or see your primary care doctor if you develop new or worsening symptoms.     Please arrange follow up with your primary medical clinic as soon as possible. You must understand that you've received an Urgent Care treatment only and that you may be released before all of your medical problems are known or treated. You, the patient, will arrange for follow up as instructed. If your symptoms worsen or fail to improve you should go to the Emergency Room.    WE CANNOT RULE OUT ALL POSSIBLE CAUSES OF YOUR SYMPTOMS IN THE URGENT CARE SETTING PLEASE GO TO THE ER IF YOU FEELS YOUR CONDITION IS WORSENING OR YOU WOULD LIKE EMERGENT EVALUATION.

## 2024-03-18 NOTE — LETTER
March 18, 2024      [facility] [address] [place] [address]  Phone: [phone]  Fax: [phone]       Patient: Angeles Guerin   YOB: 2000  Date of Visit: 03/18/2024    To Whom It May Concern:    Tram Guerin  was at [facility] on 03/18/2024. The patient may return to work/school on 03/19/2024 with no restrictions. If you have any questions or concerns, or if I can be of further assistance, please do not hesitate to contact me.    Sincerely,    Sharon Campa RT

## 2024-03-18 NOTE — PROGRESS NOTES
"Subjective:      Patient ID: Angeles Guerin is a 23 y.o. female.    Vitals:  height is 5' 2.01" (1.575 m) and weight is 72.4 kg (159 lb 11.6 oz). Her temperature is 98.5 °F (36.9 °C). Her blood pressure is 120/82 and her pulse is 74. Her respiration is 16 and oxygen saturation is 99%.     Chief Complaint: Dysuria    24 yo female presents to the clinic with c/o improving dysuria and urgency x 2 days. States she was sexually active 3 days ago, on birth control but no condoms used. States she recently finished oral abx for UTI last week and she developed a yeast infection which she is currently treating with topical medicine prescribed by OBGYN. Patient states she has been drinking cranberry juice and taking cranberry tablets along with water and she has pedialyte at home. She states normally UTIs keep her up throughout the night, which has not happened this time. States last week she tried taking AZO but did not like side effects. Denies rough intercourse, pelvic pain, n/v/d, fever, chills, previous abdominal surgeries. NKDA.     Dysuria   Associated symptoms include urgency. Pertinent negatives include no chills, flank pain, frequency, nausea or vomiting.     Constitution: Negative for chills and fever.   Gastrointestinal:  Negative for abdominal pain, history of abdominal surgery, nausea, vomiting and diarrhea.   Genitourinary:  Positive for dysuria (improving) and urgency. Negative for frequency, flank pain, vaginal bleeding, vaginal odor and genital sore.      Objective:     Vitals:    03/18/24 1201   BP: 120/82   Pulse: 74   Resp: 16   Temp: 98.5 °F (36.9 °C)       Physical Exam   Constitutional: She is oriented to person, place, and time. She appears well-developed.  Non-toxic appearance. She does not appear ill. No distress.   HENT:   Head: Normocephalic and atraumatic.   Ears:   Right Ear: External ear normal.   Left Ear: External ear normal.   Nose: Nose normal. No nasal deformity. No epistaxis. "   Mouth/Throat: Oropharynx is clear and moist and mucous membranes are normal.   Eyes: Lids are normal.   Neck: Trachea normal and phonation normal. Neck supple.   Cardiovascular: Normal rate, regular rhythm, normal heart sounds and normal pulses.   Pulmonary/Chest: Effort normal and breath sounds normal. No respiratory distress. She has no wheezes. She has no rhonchi. She has no rales.   Abdominal: Normal appearance and bowel sounds are normal. She exhibits no distension. Soft. There is no abdominal tenderness. There is no guarding, no tenderness at McBurney's point, no left CVA tenderness, negative Rovsing's sign and no right CVA tenderness.   Neurological: She is alert and oriented to person, place, and time.   Skin: Skin is warm, dry, intact and not diaphoretic.   Psychiatric: Her speech is normal and behavior is normal.   Nursing note and vitals reviewed.      Assessment:     1. Dysuria    2. History of UTI      Results for orders placed or performed in visit on 03/18/24   POCT Urinalysis, Dipstick, Automated, W/O Scope   Result Value Ref Range    POC Blood, Urine Positive (A) Negative    POC Bilirubin, Urine Negative Negative    POC Urobilinogen, Urine Norm 0.1 - 1.1    POC Ketones, Urine Negative Negative    POC Protein, Urine Negative Negative    POC Nitrates, Urine Negative Negative    POC Glucose, Urine Negative Negative    pH, UA 7.0     POC Specific Gravity, Urine 1.015 1.003 - 1.029    POC Leukocytes, Urine Negative Negative   POCT urine pregnancy   Result Value Ref Range    POC Preg Test, Ur Negative Negative     Acceptable Yes        Plan:       Dysuria  -     POCT Urinalysis, Dipstick, Automated, W/O Scope  -     POCT urine pregnancy    History of UTI  -     CULTURE, URINE      Patient Instructions   PLEASE READ YOUR DISCHARGE INSTRUCTIONS ENTIRELY AS IT CONTAINS IMPORTANT INFORMATION.      Take the antibiotics to completion if any were prescribed.     Drink plenty of fluids, wipe  front to back, take showers not baths, no scented soaps, wear breathable cotton underwear, urinate after sexual intercourse.     IF A URINE CULTURE WAS SENT: You will be contacted once it results and appropriate action will be taken if needed.     If you were given a prescription for Pyridium: Take the pyridium three times a day with meals. It will turn your urine orange. If you wear contacts it can turn your contacts orange. You do not need to take the whole prescription you can stop this once the pain is better and finish out the antibiotics.    If you are are female and on BCP use additional methods to prevent pregnancy while on the antibiotics and for one cycle after.   Cranberry juice may help. Get the 100% cranberry juice and mix 4 oz of juice with 4 oz of water and drink this 8 oz glass of liquid once a day.     Please go to the ER for worsening symptoms including fever, worsening flank pain, vomiting, etc.     Please return or see your primary care doctor if you develop new or worsening symptoms.     Please arrange follow up with your primary medical clinic as soon as possible. You must understand that you've received an Urgent Care treatment only and that you may be released before all of your medical problems are known or treated. You, the patient, will arrange for follow up as instructed. If your symptoms worsen or fail to improve you should go to the Emergency Room.    WE CANNOT RULE OUT ALL POSSIBLE CAUSES OF YOUR SYMPTOMS IN THE URGENT CARE SETTING PLEASE GO TO THE ER IF YOU FEELS YOUR CONDITION IS WORSENING OR YOU WOULD LIKE EMERGENT EVALUATION.          Medical Decision Making:   History:   Old Medical Records: I decided to obtain old medical records.  Clinical Tests:   Lab Tests: Ordered and Reviewed  The following lab test(s) were unremarkable: UPT       <> Summary of Lab: UA + blood, no signs of bacteria (Nitr/Yvonne)  Urgent Care Management:  Reviewed abnormal urinalysis with patient who verbalized  understanding. Discussed diagnosis and treatment plan while  also discussed over-the-counter medication remedies to help support current symptoms.  Urine culture pending. WASP for abx pending culture and symptoms. Patient educational handout also included in discharge paperwork and was given to patient who verbalized understanding agrees with plan of care.  Patient denies any further questions or concerns at this time.  Patient exits exam room in no acute distress.

## 2024-03-20 ENCOUNTER — TELEPHONE (OUTPATIENT)
Dept: URGENT CARE | Facility: CLINIC | Age: 24
End: 2024-03-20
Payer: COMMERCIAL

## 2024-03-20 LAB — BACTERIA UR CULT: NO GROWTH

## 2024-03-21 NOTE — TELEPHONE ENCOUNTER
Discussed negative urine culture results with patient. Symptoms have improved. No further questions or concerns.     Janis Barton PA-C

## 2025-02-01 NOTE — PROGRESS NOTES
1545 -  Post hydration complete at this time, patient tolerated well. No signs or symptoms of adverse effects noted. Positive blood return noted to PAC, flushed with 10ml of NS and heplocked with 500 units of heparin. PAC deaccessed, needle tip intact. No redness or swelling noted, Band-Aid applied over site.  Instructed mom and dad to give Angeles her neulasta shot tomorrow at 1330, also to call for any needs or concerns or if temp > 100.4. Parents verbalized complete understanding. Follow up appt given to family.      .

## 2025-02-06 DIAGNOSIS — C83.30 DIFFUSE LARGE B-CELL LYMPHOMA, UNSPECIFIED BODY REGION: Primary | ICD-10-CM

## 2025-03-11 ENCOUNTER — OFFICE VISIT (OUTPATIENT)
Dept: PEDIATRIC HEMATOLOGY/ONCOLOGY | Facility: CLINIC | Age: 25
End: 2025-03-11
Payer: COMMERCIAL

## 2025-03-11 ENCOUNTER — HOSPITAL ENCOUNTER (OUTPATIENT)
Dept: CARDIOLOGY | Facility: HOSPITAL | Age: 25
Discharge: HOME | End: 2025-03-11
Attending: PEDIATRICS
Payer: COMMERCIAL

## 2025-03-11 VITALS
RESPIRATION RATE: 20 BRPM | HEART RATE: 102 BPM | HEIGHT: 61 IN | TEMPERATURE: 98 F | SYSTOLIC BLOOD PRESSURE: 114 MMHG | WEIGHT: 172.19 LBS | BODY MASS INDEX: 32.51 KG/M2 | OXYGEN SATURATION: 98 % | DIASTOLIC BLOOD PRESSURE: 74 MMHG

## 2025-03-11 VITALS
WEIGHT: 159 LBS | HEIGHT: 62 IN | BODY MASS INDEX: 29.26 KG/M2 | HEART RATE: 76 BPM | SYSTOLIC BLOOD PRESSURE: 120 MMHG | DIASTOLIC BLOOD PRESSURE: 80 MMHG

## 2025-03-11 DIAGNOSIS — C83.30 DIFFUSE LARGE B-CELL LYMPHOMA, UNSPECIFIED BODY REGION: ICD-10-CM

## 2025-03-11 DIAGNOSIS — C83.30 DIFFUSE LARGE B-CELL LYMPHOMA, UNSPECIFIED BODY REGION: Primary | ICD-10-CM

## 2025-03-11 LAB
ASCENDING AORTA: 2.71 CM
AV AREA BY CONTINUOUS VTI: 2.4 CM2
AV INDEX (PROSTH): 0.78
AV LVOT MEAN GRADIENT: 3 MMHG
AV LVOT PEAK GRADIENT: 5 MMHG
AV MEAN GRADIENT: 6 MMHG
AV PEAK GRADIENT: 9 MMHG
AV VALVE AREA BY VELOCITY RATIO: 2.5 CM²
AV VALVE AREA: 2.4 CM2
AV VELOCITY RATIO: 0.8
BSA FOR ECHO PROCEDURE: 1.78 M2
CV ECHO LV RWT: 0.36 CM
DOP CALC AO PEAK VEL: 1.5 M/S
DOP CALC AO VTI: 31.9 CM
DOP CALC LVOT AREA: 3.1 CM2
DOP CALC LVOT DIAMETER: 2 CM
DOP CALC LVOT PEAK VEL: 1.2 M/S
DOP CALC LVOT STROKE VOLUME: 77.9 CM3
DOP CALCLVOT PEAK VEL VTI: 24.8 CM
E WAVE DECELERATION TIME: 137 MS
E/A RATIO: 1.19
E/E' RATIO: 4 M/S
ECHO EF ESTIMATED: 51 %
ECHO LV POSTERIOR WALL: 0.7 CM (ref 0.6–1.1)
FRACTIONAL SHORTENING: 25.6 % (ref 28–44)
GLOBAL LONGITUIDAL STRAIN: 19 %
INTERVENTRICULAR SEPTUM: 0.6 CM (ref 0.6–1.1)
IVC DIAMETER: 1.63 CM
IVRT: 57 MS
LA MAJOR: 5.3 CM
LA MINOR: 5.1 CM
LA WIDTH: 3.6 CM
LEFT ATRIUM SIZE: 3.3 CM
LEFT ATRIUM VOLUME INDEX MOD: 33 ML/M2
LEFT ATRIUM VOLUME INDEX: 30 ML/M2
LEFT ATRIUM VOLUME MOD: 57 ML
LEFT ATRIUM VOLUME: 52 CM3
LEFT INTERNAL DIMENSION IN SYSTOLE: 2.9 CM (ref 2.1–4)
LEFT VENTRICLE DIASTOLIC VOLUME INDEX: 38.73 ML/M2
LEFT VENTRICLE DIASTOLIC VOLUME: 67 ML
LEFT VENTRICLE MASS INDEX: 39.4 G/M2
LEFT VENTRICLE SYSTOLIC VOLUME INDEX: 19.1 ML/M2
LEFT VENTRICLE SYSTOLIC VOLUME: 33 ML
LEFT VENTRICULAR INTERNAL DIMENSION IN DIASTOLE: 3.9 CM (ref 3.5–6)
LEFT VENTRICULAR MASS: 68.2 G
LV LATERAL E/E' RATIO: 4.1
LV SEPTAL E/E' RATIO: 4.6
MV A" WAVE DURATION": 171.27 MS
MV PEAK A VEL: 0.58 M/S
MV PEAK E VEL: 0.69 M/S
OHS CV RV/LV RATIO: 0.9 CM
OHS LV EJECTION FRACTION SIMPSONS BIPLANE MOD: 55 %
PISA TR MAX VEL: 2.4 M/S
PULM VEIN A" WAVE DURATION": 171.27 MS
PULM VEIN S/D RATIO: 0.7
PULMONIC VEIN PEAK A VELOCITY: 0.2 M/S
PV PEAK D VEL: 0.53 M/S
PV PEAK S VEL: 0.37 M/S
RA MAJOR: 4.27 CM
RA PRESSURE ESTIMATED: 3 MMHG
RA WIDTH: 3.91 CM
RIGHT ATRIAL AREA: 15.5 CM2
RIGHT VENTRICLE DIASTOLIC BASEL DIMENSION: 3.5 CM
RV TB RVSP: 5 MMHG
RV TISSUE DOPPLER FREE WALL SYSTOLIC VELOCITY 1 (APICAL 4 CHAMBER VIEW): 13.3 CM/S
SINUS: 2.78 CM
STJ: 2.25 CM
TDI LATERAL: 0.17 M/S
TDI SEPTAL: 0.15 M/S
TDI: 0.16 M/S
TRICUSPID ANNULAR PLANE SYSTOLIC EXCURSION: 2.36 CM
TV PEAK GRADIENT: 23 MMHG
TV REST PULMONARY ARTERY PRESSURE: 26 MMHG
Z-SCORE OF LEFT VENTRICULAR DIMENSION IN END DIASTOLE: -2.06
Z-SCORE OF LEFT VENTRICULAR DIMENSION IN END SYSTOLE: -0.18

## 2025-03-11 PROCEDURE — 3074F SYST BP LT 130 MM HG: CPT | Mod: CPTII,S$GLB,, | Performed by: PEDIATRICS

## 2025-03-11 PROCEDURE — 3078F DIAST BP <80 MM HG: CPT | Mod: CPTII,S$GLB,, | Performed by: PEDIATRICS

## 2025-03-11 PROCEDURE — 99215 OFFICE O/P EST HI 40 MIN: CPT | Mod: S$GLB,,, | Performed by: PEDIATRICS

## 2025-03-11 PROCEDURE — 93356 MYOCRD STRAIN IMG SPCKL TRCK: CPT | Mod: ,,, | Performed by: INTERNAL MEDICINE

## 2025-03-11 PROCEDURE — 93306 TTE W/DOPPLER COMPLETE: CPT | Mod: 26,,, | Performed by: INTERNAL MEDICINE

## 2025-03-11 PROCEDURE — 93356 MYOCRD STRAIN IMG SPCKL TRCK: CPT

## 2025-03-11 PROCEDURE — 1160F RVW MEDS BY RX/DR IN RCRD: CPT | Mod: CPTII,S$GLB,, | Performed by: PEDIATRICS

## 2025-03-11 PROCEDURE — 3008F BODY MASS INDEX DOCD: CPT | Mod: CPTII,S$GLB,, | Performed by: PEDIATRICS

## 2025-03-11 PROCEDURE — 1159F MED LIST DOCD IN RCRD: CPT | Mod: CPTII,S$GLB,, | Performed by: PEDIATRICS

## 2025-03-11 PROCEDURE — 99999 PR PBB SHADOW E&M-EST. PATIENT-LVL III: CPT | Mod: PBBFAC,,, | Performed by: PEDIATRICS

## 2025-03-11 NOTE — PROGRESS NOTES
Subjective:       Patient ID: Angeles Guerin is a 24 y.o. female.    Chief Complaint: No chief complaint on file.  Angeles is a  23 year old female referred for a second opinion about Diffuse Large B Cell Lymphoma    Initial visit: She was in her usual health until approximately 8 months ago when she started complaining of left knee pain.  Initially thought to be msk in origin and was treated with supportive care.  About two months ago the pain started to worsen which inevitably led to an xray and then MRI of her left femur which showed a mass in her distal femur.  She was referred to Dr Hurley at [facility] for evaluation.  Biopsy of the lesion was c/w DLBCL.  She had a CT of her neck, chest, abdomen , and pelvis which did not show any evidence of lymphoma.  Referred here by her oncologist at [facility] for a second opinion         S/p 6 cycles of R-CHOP.   .   She has done very well since last visit,    No new issues.   Working  Denies any fevers, weight loss, night sweats.   Menstraul periods normalized on ocps    Follow-up  Pertinent negatives include no chest pain, congestion, coughing, fatigue, fever, headaches, nausea, neck pain, rash, vomiting or weakness.     Review of Systems   Constitutional:  Negative for activity change, appetite change, fatigue and fever.   HENT:  Negative for congestion, hearing loss, mouth sores, nosebleeds, rhinorrhea and sneezing.    Eyes:  Negative for photophobia and visual disturbance.   Respiratory:  Negative for cough, chest tightness, shortness of breath and wheezing.    Cardiovascular:  Negative for chest pain, palpitations and leg swelling.   Gastrointestinal:  Negative for abdominal distention, blood in stool, constipation, diarrhea, nausea and vomiting.   Genitourinary:  Negative for difficulty urinating, hematuria, menstrual problem and pelvic pain.   Musculoskeletal:  Negative for gait problem and neck pain.   Skin:  Negative for pallor and rash.   Neurological:  Negative for  dizziness, weakness, light-headedness and headaches.   Hematological:  Negative for adenopathy. Does not bruise/bleed easily.   Psychiatric/Behavioral:  Negative for behavioral problems.        Objective:      Physical Exam  Constitutional:       Appearance: She is well-developed.   HENT:      Head: Normocephalic and atraumatic.      Right Ear: External ear normal.      Left Ear: External ear normal.      Nose: Nose normal.   Eyes:      Pupils: Pupils are equal, round, and reactive to light.   Cardiovascular:      Rate and Rhythm: Normal rate and regular rhythm.      Heart sounds: Normal heart sounds. No murmur heard.     No friction rub. No gallop.   Pulmonary:      Effort: No respiratory distress.      Breath sounds: Normal breath sounds. No wheezing or rales.   Chest:      Chest wall: No tenderness.   Abdominal:      General: Bowel sounds are normal. There is no distension.      Palpations: Abdomen is soft. There is no mass.      Tenderness: There is no abdominal tenderness. There is no guarding or rebound.   Musculoskeletal:         General: No tenderness. Normal range of motion.      Cervical back: Normal range of motion and neck supple.   Lymphadenopathy:      Cervical: No cervical adenopathy.   Skin:     General: Skin is warm and dry.      Coloration: Skin is not pale.      Findings: No erythema or rash.   Neurological:      Mental Status: She is alert and oriented to person, place, and time.      Cranial Nerves: No cranial nerve deficit.         Assessment:       No diagnosis found.    Plan:       25yo female with DLBCL of the bone  PErsonally reviewed CT neck c/a/p. Bone scan, PET scan.  No evidence of disease outside the femur lesion  CSF negative  Bone marrow negative  Our pathologist reviewed path and agrees that this is DLBCL although cannot subtype it nor can they do genetic studies given decalcified specimend  Finished therapy 9/2017  Total Cytoxan dose: 4500 mg/m2  Total Anthracycline dose:  300  mg/m2       Doing well   No symptoms  ESR pending   ECHO wnl         RTC in 1 yr  PE  and labs                 I spent 60min with family >50% in counseling

## 2025-08-20 DIAGNOSIS — Z30.431 SURVEILLANCE OF PREVIOUSLY PRESCRIBED INTRAUTERINE CONTRACEPTIVE DEVICE: Primary | ICD-10-CM

## 2025-08-20 DIAGNOSIS — R10.2 PELVIC PAIN SYNDROME: ICD-10-CM

## (undated) DEVICE — ELECTRODE NEEDLE 2.8IN

## (undated) DEVICE — DRAPE THYROID WITH ARMBOARD

## (undated) DEVICE — SKIN MARKERS DEROYAL

## (undated) DEVICE — ELECTRODE BLADE INSULATED 1 IN

## (undated) DEVICE — SOL NACL 0.9% INJ PF/50151

## (undated) DEVICE — INTRODUCER 7FR

## (undated) DEVICE — GOWN SURGICAL X-LARGE

## (undated) DEVICE — ADHESIVE DERMABOND ADVANCED

## (undated) DEVICE — TRAY MINOR GEN SURG

## (undated) DEVICE — DRESSING TELFA N ADH 3X8

## (undated) DEVICE — DRESSING TRANS 4X4 TEGADERM

## (undated) DEVICE — DRESSING TEGADERM 2 3/8 X 2.75

## (undated) DEVICE — SUT 3-0 VICRYL / RB-1

## (undated) DEVICE — SET DECANTER MEDICHOICE

## (undated) DEVICE — CLOSURE SKIN STERI STRIP 1/2X4

## (undated) DEVICE — DRESSING TELFA PAD N ADH 2X3

## (undated) DEVICE — BANDAID STRIP PLASTIC 3/4X3

## (undated) DEVICE — BLADE SURG CARBON STEEL SZ11

## (undated) DEVICE — DRESSING TEGADERM 2X2 3/4

## (undated) DEVICE — CLOSURE SKIN STERI STRIP 1/4X3

## (undated) DEVICE — SUT PROLENE RB-1 BL MONO

## (undated) DEVICE — SEE MEDLINE ITEM 157117

## (undated) DEVICE — SEE MEDLINE ITEM 154981

## (undated) DEVICE — DRESSING ANTIMICROBIAL 3/4 IN

## (undated) DEVICE — COVER PROBE NL STRL 3.6X96IN

## (undated) DEVICE — ADHESIVE MASTISOL VIAL 48/BX